# Patient Record
Sex: FEMALE | Race: WHITE | NOT HISPANIC OR LATINO | Employment: FULL TIME | URBAN - METROPOLITAN AREA
[De-identification: names, ages, dates, MRNs, and addresses within clinical notes are randomized per-mention and may not be internally consistent; named-entity substitution may affect disease eponyms.]

---

## 2020-02-15 ENCOUNTER — APPOINTMENT (OUTPATIENT)
Dept: RADIOLOGY | Facility: CLINIC | Age: 50
End: 2020-02-15
Payer: COMMERCIAL

## 2020-02-15 ENCOUNTER — OFFICE VISIT (OUTPATIENT)
Dept: URGENT CARE | Facility: CLINIC | Age: 50
End: 2020-02-15
Payer: COMMERCIAL

## 2020-02-15 VITALS
SYSTOLIC BLOOD PRESSURE: 140 MMHG | WEIGHT: 253.2 LBS | OXYGEN SATURATION: 96 % | HEART RATE: 121 BPM | DIASTOLIC BLOOD PRESSURE: 90 MMHG | TEMPERATURE: 100.9 F | BODY MASS INDEX: 43.23 KG/M2 | HEIGHT: 64 IN

## 2020-02-15 DIAGNOSIS — B96.89 ACUTE BACTERIAL BRONCHITIS: Primary | ICD-10-CM

## 2020-02-15 DIAGNOSIS — J20.8 ACUTE BACTERIAL BRONCHITIS: Primary | ICD-10-CM

## 2020-02-15 DIAGNOSIS — R07.89 CHEST TIGHTNESS: ICD-10-CM

## 2020-02-15 DIAGNOSIS — R11.0 NAUSEA: ICD-10-CM

## 2020-02-15 DIAGNOSIS — R06.2 WHEEZING: ICD-10-CM

## 2020-02-15 PROCEDURE — 99213 OFFICE O/P EST LOW 20 MIN: CPT | Performed by: PHYSICIAN ASSISTANT

## 2020-02-15 PROCEDURE — 71046 X-RAY EXAM CHEST 2 VIEWS: CPT

## 2020-02-15 RX ORDER — LEVALBUTEROL TARTRATE 45 UG/1
1-2 AEROSOL, METERED ORAL EVERY 6 HOURS PRN
Qty: 1 INHALER | Refills: 0 | Status: SHIPPED | OUTPATIENT
Start: 2020-02-15

## 2020-02-15 RX ORDER — ACETAMINOPHEN 160 MG
2000 TABLET,DISINTEGRATING ORAL DAILY
COMMUNITY
Start: 2020-01-14

## 2020-02-15 RX ORDER — AMLODIPINE BESYLATE 2.5 MG/1
2.5 TABLET ORAL EVERY EVENING
COMMUNITY
Start: 2019-12-12

## 2020-02-15 RX ORDER — ONDANSETRON 4 MG/1
4 TABLET, ORALLY DISINTEGRATING ORAL EVERY 8 HOURS PRN
Qty: 15 TABLET | Refills: 0 | Status: SHIPPED | OUTPATIENT
Start: 2020-02-15 | End: 2020-02-20

## 2020-02-15 RX ORDER — LISINOPRIL 40 MG/1
40 TABLET ORAL DAILY
COMMUNITY
Start: 2019-12-06

## 2020-02-15 RX ORDER — SERTRALINE HYDROCHLORIDE 25 MG/1
25 TABLET, FILM COATED ORAL DAILY
COMMUNITY
Start: 2019-12-11

## 2020-02-15 RX ORDER — LEVOTHYROXINE SODIUM 100 MCG
TABLET ORAL
COMMUNITY
Start: 2019-12-12

## 2020-02-15 RX ORDER — LEVALBUTEROL INHALATION SOLUTION 0.63 MG/3ML
0.63 SOLUTION RESPIRATORY (INHALATION) ONCE
Status: COMPLETED | OUTPATIENT
Start: 2020-02-15 | End: 2020-02-15

## 2020-02-15 RX ORDER — LORAZEPAM 1 MG/1
1 TABLET ORAL
COMMUNITY
Start: 2020-01-23

## 2020-02-15 RX ORDER — ONDANSETRON 4 MG/1
4 TABLET, ORALLY DISINTEGRATING ORAL ONCE
Status: COMPLETED | OUTPATIENT
Start: 2020-02-15 | End: 2020-02-15

## 2020-02-15 RX ORDER — AZITHROMYCIN 250 MG/1
TABLET, FILM COATED ORAL
Qty: 6 TABLET | Refills: 0 | Status: SHIPPED | OUTPATIENT
Start: 2020-02-15 | End: 2020-02-19

## 2020-02-15 RX ORDER — HYDROCHLOROTHIAZIDE 25 MG/1
25 TABLET ORAL EVERY MORNING
COMMUNITY
Start: 2019-12-11

## 2020-02-15 RX ADMIN — ONDANSETRON 4 MG: 4 TABLET, ORALLY DISINTEGRATING ORAL at 12:48

## 2020-02-15 RX ADMIN — LEVALBUTEROL INHALATION SOLUTION 0.63 MG: 0.63 SOLUTION RESPIRATORY (INHALATION) at 12:48

## 2020-02-15 NOTE — PATIENT INSTRUCTIONS

## 2020-02-15 NOTE — PROGRESS NOTES
Assessment/Plan    Acute bacterial bronchitis [J20 8, B96 89]  1  Acute bacterial bronchitis  azithromycin (ZITHROMAX) 250 mg tablet   2  Nausea  ondansetron (ZOFRAN-ODT) dispersible tablet 4 mg    ondansetron (ZOFRAN-ODT) 4 mg disintegrating tablet   3  Chest tightness  XR chest pa & lateral    levalbuterol (XOPENEX) inhalation solution 0 63 mg    levalbuterol (XOPENEX HFA) 45 mcg/act inhaler   4  Wheezing  levalbuterol (XOPENEX) inhalation solution 0 63 mg    levalbuterol [facility] HFA) 45 mcg/act inhaler         Subjective:     Patient ID: Philippe Yun is a 52 y o  female  Reason For Visit / Chief Complaint  Chief Complaint   Patient presents with    Wheezing     cough; started last night         59-year-old female presents the clinic with fever, cough, headaches, nausea, abdominal pain, nasal congestion, rhinorrhea, postnasal drip, wheezing  Patient states that she took her regular meds this morning but did not eat due to upset stomach  No history of asthma  Patient has not taken any other over-the-counter medications for symptom relief  Past Medical History:   Diagnosis Date    Anxiety     Disease of thyroid gland     Hypertension        Past Surgical History:   Procedure Laterality Date    THYROIDECTOMY Bilateral     WISDOM TOOTH EXTRACTION         Family History   Problem Relation Age of Onset    No Known Problems Mother     Abdominal aortic aneurysm Father        Review of Systems   Constitutional: Positive for fever  Negative for chills  HENT: Positive for postnasal drip, rhinorrhea (mild) and sore throat (from coughing)  Negative for congestion  Respiratory: Positive for cough and wheezing  Gastrointestinal: Positive for abdominal pain (upset stomach) and nausea (dry heaving)  Negative for vomiting  Neurological: Positive for headaches (forehead)         Objective:    /90   Pulse (!) 121   Temp (!) 100 9 °F (38 3 °C) (Tympanic)   Ht 5' 4" (1 626 m)   Wt 115 kg (253 lb 3 2 oz)   SpO2 96%   BMI 43 46 kg/m²     Physical Exam   Constitutional: She is oriented to person, place, and time  She appears well-developed and well-nourished  She is active  No distress  HENT:   Head: Normocephalic and atraumatic  Mouth/Throat: Uvula is midline and mucous membranes are normal  Posterior oropharyngeal erythema (PND) present  Cardiovascular: Normal rate, regular rhythm and normal heart sounds  Pulmonary/Chest: Effort normal  No accessory muscle usage  No respiratory distress  She has wheezes (inspiratory and expiratory wheezes) in the right upper field, the right middle field, the right lower field and the left upper field  Chest x-ray:  No acute cardiopulmonary disease noted  Patient improved after neb treatment   Musculoskeletal: Normal range of motion  Neurological: She is alert and oriented to person, place, and time  Skin: She is not diaphoretic  Nursing note and vitals reviewed